# Patient Record
Sex: MALE | Race: WHITE
[De-identification: names, ages, dates, MRNs, and addresses within clinical notes are randomized per-mention and may not be internally consistent; named-entity substitution may affect disease eponyms.]

---

## 2018-10-11 ENCOUNTER — APPOINTMENT (OUTPATIENT)
Dept: PSYCHIATRY | Facility: CLINIC | Age: 53
End: 2018-10-11
Payer: MEDICARE

## 2018-10-11 DIAGNOSIS — Z81.8 FAMILY HISTORY OF OTHER MENTAL AND BEHAVIORAL DISORDERS: ICD-10-CM

## 2018-10-11 DIAGNOSIS — F31.9 BIPOLAR DISORDER, UNSPECIFIED: ICD-10-CM

## 2018-10-11 PROBLEM — Z00.00 ENCOUNTER FOR PREVENTIVE HEALTH EXAMINATION: Status: ACTIVE | Noted: 2018-10-11

## 2018-10-11 PROCEDURE — 99205 OFFICE O/P NEW HI 60 MIN: CPT

## 2018-10-11 RX ORDER — BENZTROPINE MESYLATE 1 MG/1
1 TABLET ORAL
Refills: 0 | Status: ACTIVE | COMMUNITY

## 2018-10-11 RX ORDER — OXCARBAZEPINE 600 MG/1
600 TABLET, FILM COATED ORAL
Qty: 30 | Refills: 1 | Status: ACTIVE | COMMUNITY
Start: 2018-10-11 | End: 1900-01-01

## 2018-10-11 RX ORDER — TRAZODONE HYDROCHLORIDE 100 MG/1
100 TABLET ORAL
Refills: 0 | Status: ACTIVE | COMMUNITY

## 2018-10-11 RX ORDER — OXCARBAZEPINE 300 MG/1
300 TABLET, FILM COATED ORAL
Qty: 30 | Refills: 1 | Status: ACTIVE | COMMUNITY
Start: 2018-10-11 | End: 1900-01-01

## 2018-10-11 RX ORDER — RISPERIDONE 3 MG/1
3 TABLET, FILM COATED ORAL
Qty: 60 | Refills: 1 | Status: ACTIVE | COMMUNITY
Start: 2018-10-11 | End: 1900-01-01

## 2018-10-11 RX ORDER — RISPERIDONE 3 MG/1
3 TABLET, FILM COATED ORAL
Refills: 0 | Status: ACTIVE | COMMUNITY

## 2018-10-11 RX ORDER — OXCARBAZEPINE 600 MG/1
TABLET, FILM COATED ORAL
Refills: 0 | Status: ACTIVE | COMMUNITY

## 2018-10-12 ENCOUNTER — EMERGENCY (EMERGENCY)
Facility: HOSPITAL | Age: 53
LOS: 1 days | Discharge: PSYCHIATRIC OR SUBSTANCE USE FACILITY | End: 2018-10-12
Attending: EMERGENCY MEDICINE
Payer: MEDICARE

## 2018-10-12 ENCOUNTER — INPATIENT (INPATIENT)
Facility: HOSPITAL | Age: 53
LOS: 16 days | Discharge: HOME | End: 2018-10-29
Attending: PSYCHIATRY & NEUROLOGY | Admitting: PSYCHIATRY & NEUROLOGY
Payer: MEDICARE

## 2018-10-12 VITALS — HEIGHT: 75 IN | WEIGHT: 220.02 LBS

## 2018-10-12 VITALS
RESPIRATION RATE: 18 BRPM | OXYGEN SATURATION: 100 % | SYSTOLIC BLOOD PRESSURE: 148 MMHG | DIASTOLIC BLOOD PRESSURE: 95 MMHG | HEART RATE: 88 BPM | TEMPERATURE: 98 F

## 2018-10-12 VITALS — TEMPERATURE: 98 F | WEIGHT: 235.89 LBS | HEIGHT: 75 IN | RESPIRATION RATE: 19 BRPM | OXYGEN SATURATION: 100 %

## 2018-10-12 DIAGNOSIS — F25.0 SCHIZOAFFECTIVE DISORDER, BIPOLAR TYPE: ICD-10-CM

## 2018-10-12 DIAGNOSIS — F20.9 SCHIZOPHRENIA, UNSPECIFIED: ICD-10-CM

## 2018-10-12 LAB
ALBUMIN SERPL ELPH-MCNC: 4.1 G/DL — SIGNIFICANT CHANGE UP (ref 3.3–5)
ALP SERPL-CCNC: 94 U/L — SIGNIFICANT CHANGE UP (ref 40–120)
ALT FLD-CCNC: 75 U/L — HIGH (ref 10–45)
ANION GAP SERPL CALC-SCNC: 14 MMOL/L — SIGNIFICANT CHANGE UP (ref 5–17)
APAP SERPL-MCNC: <15 UG/ML — SIGNIFICANT CHANGE UP (ref 10–30)
APPEARANCE UR: CLEAR — SIGNIFICANT CHANGE UP
AST SERPL-CCNC: 32 U/L — SIGNIFICANT CHANGE UP (ref 10–40)
BASOPHILS # BLD AUTO: 0 K/UL — SIGNIFICANT CHANGE UP (ref 0–0.2)
BASOPHILS NFR BLD AUTO: 0.3 % — SIGNIFICANT CHANGE UP (ref 0–2)
BILIRUB SERPL-MCNC: 0.2 MG/DL — SIGNIFICANT CHANGE UP (ref 0.2–1.2)
BILIRUB UR-MCNC: NEGATIVE — SIGNIFICANT CHANGE UP
BUN SERPL-MCNC: 14 MG/DL — SIGNIFICANT CHANGE UP (ref 7–23)
CALCIUM SERPL-MCNC: 9.1 MG/DL — SIGNIFICANT CHANGE UP (ref 8.4–10.5)
CHLORIDE SERPL-SCNC: 104 MMOL/L — SIGNIFICANT CHANGE UP (ref 96–108)
CO2 SERPL-SCNC: 22 MMOL/L — SIGNIFICANT CHANGE UP (ref 22–31)
COLOR SPEC: YELLOW — SIGNIFICANT CHANGE UP
CREAT SERPL-MCNC: 1.01 MG/DL — SIGNIFICANT CHANGE UP (ref 0.5–1.3)
DIFF PNL FLD: NEGATIVE — SIGNIFICANT CHANGE UP
EOSINOPHIL # BLD AUTO: 0 K/UL — SIGNIFICANT CHANGE UP (ref 0–0.5)
EOSINOPHIL NFR BLD AUTO: 0.6 % — SIGNIFICANT CHANGE UP (ref 0–6)
ETHANOL SERPL-MCNC: SIGNIFICANT CHANGE UP MG/DL (ref 0–10)
GLUCOSE SERPL-MCNC: 94 MG/DL — SIGNIFICANT CHANGE UP (ref 70–99)
GLUCOSE UR QL: NEGATIVE — SIGNIFICANT CHANGE UP
HCT VFR BLD CALC: 40 % — SIGNIFICANT CHANGE UP (ref 39–50)
HGB BLD-MCNC: 13.4 G/DL — SIGNIFICANT CHANGE UP (ref 13–17)
KETONES UR-MCNC: NEGATIVE — SIGNIFICANT CHANGE UP
LEUKOCYTE ESTERASE UR-ACNC: NEGATIVE — SIGNIFICANT CHANGE UP
LYMPHOCYTES # BLD AUTO: 1.4 K/UL — SIGNIFICANT CHANGE UP (ref 1–3.3)
LYMPHOCYTES # BLD AUTO: 28.1 % — SIGNIFICANT CHANGE UP (ref 13–44)
MCHC RBC-ENTMCNC: 28.8 PG — SIGNIFICANT CHANGE UP (ref 27–34)
MCHC RBC-ENTMCNC: 33.6 GM/DL — SIGNIFICANT CHANGE UP (ref 32–36)
MCV RBC AUTO: 85.8 FL — SIGNIFICANT CHANGE UP (ref 80–100)
MONOCYTES # BLD AUTO: 0.8 K/UL — SIGNIFICANT CHANGE UP (ref 0–0.9)
MONOCYTES NFR BLD AUTO: 16.5 % — HIGH (ref 2–14)
NEUTROPHILS # BLD AUTO: 2.8 K/UL — SIGNIFICANT CHANGE UP (ref 1.8–7.4)
NEUTROPHILS NFR BLD AUTO: 54.4 % — SIGNIFICANT CHANGE UP (ref 43–77)
NITRITE UR-MCNC: NEGATIVE — SIGNIFICANT CHANGE UP
PCP SPEC-MCNC: SIGNIFICANT CHANGE UP
PH UR: 6.5 — SIGNIFICANT CHANGE UP (ref 5–8)
PLATELET # BLD AUTO: 165 K/UL — SIGNIFICANT CHANGE UP (ref 150–400)
POTASSIUM SERPL-MCNC: 4 MMOL/L — SIGNIFICANT CHANGE UP (ref 3.5–5.3)
POTASSIUM SERPL-SCNC: 4 MMOL/L — SIGNIFICANT CHANGE UP (ref 3.5–5.3)
PROT SERPL-MCNC: 6.1 G/DL — SIGNIFICANT CHANGE UP (ref 6–8.3)
PROT UR-MCNC: NEGATIVE — SIGNIFICANT CHANGE UP
RBC # BLD: 4.66 M/UL — SIGNIFICANT CHANGE UP (ref 4.2–5.8)
RBC # FLD: 12.7 % — SIGNIFICANT CHANGE UP (ref 10.3–14.5)
SALICYLATES SERPL-MCNC: <2 MG/DL — LOW (ref 15–30)
SODIUM SERPL-SCNC: 140 MMOL/L — SIGNIFICANT CHANGE UP (ref 135–145)
SP GR SPEC: 1.02 — SIGNIFICANT CHANGE UP (ref 1.01–1.02)
UROBILINOGEN FLD QL: NEGATIVE — SIGNIFICANT CHANGE UP
WBC # BLD: 5 K/UL — SIGNIFICANT CHANGE UP (ref 3.8–10.5)
WBC # FLD AUTO: 5 K/UL — SIGNIFICANT CHANGE UP (ref 3.8–10.5)

## 2018-10-12 PROCEDURE — 99285 EMERGENCY DEPT VISIT HI MDM: CPT | Mod: GC,25

## 2018-10-12 PROCEDURE — 93010 ELECTROCARDIOGRAM REPORT: CPT

## 2018-10-12 PROCEDURE — 99285 EMERGENCY DEPT VISIT HI MDM: CPT

## 2018-10-12 PROCEDURE — 80307 DRUG TEST PRSMV CHEM ANLYZR: CPT

## 2018-10-12 PROCEDURE — 93005 ELECTROCARDIOGRAM TRACING: CPT

## 2018-10-12 PROCEDURE — 85027 COMPLETE CBC AUTOMATED: CPT

## 2018-10-12 PROCEDURE — 99222 1ST HOSP IP/OBS MODERATE 55: CPT

## 2018-10-12 PROCEDURE — 80053 COMPREHEN METABOLIC PANEL: CPT

## 2018-10-12 PROCEDURE — 81003 URINALYSIS AUTO W/O SCOPE: CPT

## 2018-10-12 RX ORDER — DIPHENHYDRAMINE HCL 50 MG
50 CAPSULE ORAL ONCE
Refills: 0 | Status: COMPLETED | OUTPATIENT
Start: 2018-10-12 | End: 2018-10-13

## 2018-10-12 RX ORDER — OXCARBAZEPINE 300 MG/1
600 TABLET, FILM COATED ORAL
Refills: 0 | Status: DISCONTINUED | OUTPATIENT
Start: 2018-10-12 | End: 2018-10-29

## 2018-10-12 RX ORDER — HYDROXYZINE HCL 10 MG
50 TABLET ORAL EVERY 6 HOURS
Refills: 0 | Status: DISCONTINUED | OUTPATIENT
Start: 2018-10-12 | End: 2018-10-29

## 2018-10-12 RX ORDER — NICOTINE POLACRILEX 2 MG
1 GUM BUCCAL EVERY 6 HOURS
Refills: 0 | Status: DISCONTINUED | OUTPATIENT
Start: 2018-10-12 | End: 2018-10-29

## 2018-10-12 RX ORDER — OXCARBAZEPINE 300 MG/1
300 TABLET, FILM COATED ORAL
Refills: 0 | Status: DISCONTINUED | OUTPATIENT
Start: 2018-10-12 | End: 2018-10-29

## 2018-10-12 RX ORDER — RISPERIDONE 4 MG/1
3 TABLET ORAL
Refills: 0 | Status: DISCONTINUED | OUTPATIENT
Start: 2018-10-12 | End: 2018-10-26

## 2018-10-12 RX ORDER — HALOPERIDOL DECANOATE 100 MG/ML
5 INJECTION INTRAMUSCULAR ONCE
Refills: 0 | Status: DISCONTINUED | OUTPATIENT
Start: 2018-10-12 | End: 2018-10-15

## 2018-10-12 RX ORDER — NICOTINE POLACRILEX 2 MG
1 GUM BUCCAL ONCE
Refills: 0 | Status: DISCONTINUED | OUTPATIENT
Start: 2018-10-12 | End: 2018-10-16

## 2018-10-12 RX ORDER — DOCUSATE SODIUM 100 MG
100 CAPSULE ORAL DAILY
Refills: 0 | Status: DISCONTINUED | OUTPATIENT
Start: 2018-10-12 | End: 2018-10-29

## 2018-10-12 RX ORDER — DIPHENHYDRAMINE HCL 50 MG
50 CAPSULE ORAL ONCE
Refills: 0 | Status: DISCONTINUED | OUTPATIENT
Start: 2018-10-12 | End: 2018-10-29

## 2018-10-12 RX ADMIN — RISPERIDONE 3 MILLIGRAM(S): 4 TABLET ORAL at 22:22

## 2018-10-12 RX ADMIN — Medication 1 EACH: at 22:22

## 2018-10-12 RX ADMIN — Medication 100 MILLIGRAM(S): at 22:55

## 2018-10-12 RX ADMIN — Medication 50 MILLIGRAM(S): at 22:22

## 2018-10-12 RX ADMIN — OXCARBAZEPINE 600 MILLIGRAM(S): 300 TABLET, FILM COATED ORAL at 22:22

## 2018-10-12 NOTE — ED PROVIDER NOTE - MEDICAL DECISION MAKING DETAILS
Known schizophrenic with recent admission presenting with command hallucinations telling him to harm himself, Known schizophrenic with recent admission presenting with command hallucinations telling him to harm himself. Plan: medical clearance labs, psych consult, 1:1 observation

## 2018-10-12 NOTE — ED BEHAVIORAL HEALTH ASSESSMENT NOTE - DETAILS
patient with command auditory hallucinations to walk into traffic unknown availability of beds Spoke with Marianne Olivo A

## 2018-10-12 NOTE — ED BEHAVIORAL HEALTH ASSESSMENT NOTE - AXIS IV
Problems with primary support/Problem related to social environment/Housing problems/Economic problems

## 2018-10-12 NOTE — ED BEHAVIORAL HEALTH ASSESSMENT NOTE - PSYCHIATRIC ISSUES AND PLAN (INCLUDE STANDING AND PRN MEDICATION)
Schizoaffective Disorder - Continue Trileptal 900 mg Schizoaffective Disorder - Continue Trileptal 900 mg daily and Risperdal 3 mg BID

## 2018-10-12 NOTE — ED PROVIDER NOTE - PHYSICAL EXAMINATION
Gen: NAD, AOx3  Head: NCAT  HEENT: PERRL, oral mucosa moist, normal conjunctiva  Lung: CTAB, no respiratory distress  CV: rrr, no murmurs, Normal perfusion  Abd: soft, NTND, no CVA tenderness  MSK: No edema, no visible deformities  Neuro: No focal neurologic deficits, CN intact, motor and sensation intact  Skin: No rash   Psych: normal affect

## 2018-10-12 NOTE — ED ADULT NURSE NOTE - NSIMPLEMENTINTERV_GEN_ALL_ED
Implemented All Universal Safety Interventions:  [place] to call system. Call bell, personal items and telephone within reach. Instruct patient to call for assistance. Room bathroom lighting operational. Non-slip footwear when patient is off stretcher. Physically safe environment: no spills, clutter or unnecessary equipment. Stretcher in lowest position, wheels locked, appropriate side rails in place.

## 2018-10-12 NOTE — ED ADULT NURSE NOTE - EYE CONTACT
N: DASH/TLC diet, 1L fluid restriction  E: Replete lyytes PRN K<4 Mg<2   Dispo: [facility]ach; PT eval- rec Home w/o home PT N: DASH/TLC diet, 1L fluid restriction  E: Replete lyytes PRN K<4 Mg<2   Dispo: D/c pending clinical progress.  PT eval- rec Home w/o home PT Poor

## 2018-10-12 NOTE — CHART NOTE - NSCHARTNOTEFT_GEN_A_CORE
Screening Medical Evaluation  Patient Admitted from: [facility] ER    [facility] admitting diagnosis: Schizophrenia    PAST MEDICAL & SURGICAL HISTORY:        Allergies    No Known Allergies    Intolerances        Social History:     FAMILY HISTORY:      MEDICATIONS  (STANDING):  diphenhydrAMINE 50 milliGRAM(s) Oral once  docusate sodium 100 milliGRAM(s) Oral daily  OXcarbazepine 300 milliGRAM(s) Oral <User Schedule>  OXcarbazepine 600 milliGRAM(s) Oral <User Schedule>  risperiDONE   Tablet 3 milliGRAM(s) Oral two times a day    MEDICATIONS  (PRN):  diphenhydrAMINE   Injectable 50 milliGRAM(s) IntraMuscular once PRN agitation  haloperidol    Injectable 5 milliGRAM(s) IntraMuscular once PRN psychotic agitation  hydrOXYzine hydrochloride 50 milliGRAM(s) Oral every 6 hours PRN Anxiety  LORazepam   Injectable 2 milliGRAM(s) IntraMuscular once PRN Agitation  nicotine - Inhaler 1 Each Inhalation every 6 hours PRN nicotine craving      Vital Signs Last 24 Hrs  T(C): 36.7 (12 Oct 2018 21:54), Max: 36.7 (12 Oct 2018 14:43)  T(F): 98 (12 Oct 2018 21:54), Max: 98 (12 Oct 2018 14:43)  HR: 88 (12 Oct 2018 18:58) (86 - 88)  BP: 148/95 (12 Oct 2018 18:58) (148/95 - 151/82)  BP(mean): --  RR: 19 (12 Oct 2018 21:54) (18 - 19)  SpO2: 100% (12 Oct 2018 21:54) (98% - 100%)  CAPILLARY BLOOD GLUCOSE            PHYSICAL EXAM:  GENERAL: NAD, well-developed  HEAD:  Atraumatic, Normocephalic  EYES: EOMI, PERRLA, conjunctiva and sclera clear  NECK: Supple, No JVD  CHEST/LUNG: Clear to auscultation bilaterally; No wheeze  HEART: Regular rate and rhythm; No murmurs, rubs, or gallops  ABDOMEN: Soft, Nontender, Nondistended; Bowel sounds present  EXTREMITIES:  2+ Peripheral Pulses, No clubbing, cyanosis, or edema  PSYCH: AAOx3  NEUROLOGY: non-focal  SKIN: In tact    LABS:                        13.4   5.0   )-----------( 165      ( 12 Oct 2018 15:34 )             40.0     10-12    140  |  104  |  14  ----------------------------<  94  4.0   |  22  |  1.01    Ca    9.1      12 Oct 2018 15:34    TPro  6.1  /  Alb  4.1  /  TBili  0.2  /  DBili  x   /  AST  32  /  ALT  75<H>  /  AlkPhos  94  10-12          Urinalysis Basic - ( 12 Oct 2018 15:34 )    Color: Yellow / Appearance: Clear / S.018 / pH: x  Gluc: x / Ketone: Negative  / Bili: Negative / Urobili: Negative   Blood: x / Protein: Negative / Nitrite: Negative   Leuk Esterase: Negative / RBC: x / WBC x   Sq Epi: x / Non Sq Epi: x / Bacteria: x        RADIOLOGY & ADDITIONAL TESTS:    Assessment and Plan: 52 yo M with no significant PMH is admitted to [facility] with a primary psychiatric diagnosis of Schizophrenia. The pt currently denies having any medical complaints such as chest pain, sob, abdominal pain, n/v/d/c, or any problems with urination or bowel movements. The rest of his screening physical is unremarkable.    1.Schizophrenia-Plan: continue with meds as per primary psychiatric team

## 2018-10-12 NOTE — ED BEHAVIORAL HEALTH ASSESSMENT NOTE - HPI (INCLUDE ILLNESS QUALITY, SEVERITY, DURATION, TIMING, CONTEXT, MODIFYING FACTORS, ASSOCIATED SIGNS AND SYMPTOMS)
Mr. Shaila Fisher is a 52 yo male with a history of schizoaffective disorder, multiple psychiatric admissions, and no suicide attempts with a medical history significant for HTN who presents due to manic symptoms. Patient is nonsensical and demanding to be admitted to the psychiatric hospital.    He states that he was previously living in [place], [place] but is originally from [place]. He states that 5 days ago he took a [employer] flight from [place] to [place]. He states that his ultimate destination is to go to [employer] and meet the [employer]. He did not endorse any S/I/I/P at the time of interview. He maintained that he was having a manic episode and had been "doing a lot of stuff" but needed to "take it easy" now. He stated that he found Dr. Casanova after walking around the area and noted that she was in behavioral health. He stated that he stopped by Dr. Casanova's office again today and was subsequently sent to the ED. The patient noted that he was in a psychiatric hospital a week ago and promised that he would be "a good patient this time." He mused that he was probably "annoying to others" during his last hospitalization and said that he would "try not to do that this time"     Patient told the ED staff that he was having auditory hallucinations to walk into traffic. He also arrived with plastic around his legs that were intended to be "leg shackles"    Patient stated that he is currently homeless and had been staying in hotels. He stayed [place] [employer] [place].     Collateral was obtained from the patient's psychiatrist who stated that the patient appeared manic in her office. The patient was impulsive and removed his shirt. Her confirmed that he was from [place] and was denying S/I/I/P at that time. She was able to speak with his cousin Dr. Ajay Fisher who confirmed that the patient has had multiple manic episodes in the past.     Patient gave permission to talk to his cousin Dr. Ajay Fisher in [place] however the writer was unable to reach him. Mr. Shaila Fisher is a 52 yo male with a history of schizoaffective disorder, multiple psychiatric admissions, and no suicide attempts with a medical history significant for HTN who presents due to manic symptoms. Patient is nonsensical and demanding to be admitted to the psychiatric hospital.    He states that he was previously living in [place], [place] but is originally from [place]. He states that 5 days ago he took a [employer] flight from [place] to [place]. He states that his ultimate destination is to go to [employer] and meet the ray. He did not endorse any S/I/I/P at the time of interview. He maintained that he was having a manic episode and had been "doing a lot of stuff" but needed to "take it easy" now. He stated that he found Dr. Casanova after walking around the area and noted that she was in behavioral health. He stated that he stopped by Dr. Casanova's office again today and was subsequently sent to the ED. The patient noted that he was in a psychiatric hospital a week ago and promised that he would be "a good patient this time." He mused that he was probably "annoying to others" during his last hospitalization and said that he would "try not to do that this time"     Patient told the ED staff that he was having auditory hallucinations to walk into traffic. He also arrived with plastic around his legs that were intended to be "leg shackles"    Patient stated that he is currently homeless and had been staying in hotels. [place] [employer] [place].     Collateral was obtained from the patient's psychiatrist who stated that the patient appeared manic in her office. The patient was impulsive and removed his shirt. Her confirmed that he was from [place] and was denying S/I/I/P at that time. She was able to speak with his cousin Dr. Ajay Fisher who confirmed that the patient has had multiple manic episodes in the past.     Brother, Dr. Ajay Fisher [phone], states that he hasn't been doing well, was homeless, and has been manic "forever". Brother states that it's been hard to keep track of him and has been living in hotels. Brother states that he flew up to [place] on a "whim" told brother he was "on the move" and going to "get off disability" and "get a job." He has had a tough year and he was kicked out of multiple apartments because he was "bothering" other tenants. He has been homeless for approximately 1 month.  Brother states that he was being seen at [facility] in [place] but was having trouble making follow up appointments. Brother states that he has a history of being relatively stable on [place] but had trouble making it to his appointments on time.

## 2018-10-12 NOTE — CHART NOTE - NSCHARTNOTEFT_GEN_A_CORE
EMERGENCY ROOM : Chart reviewed for inpatient psych transfer. Patient is a 54 y/o, male, non-caregiver, undomiciled, with PMH of HTN, PPH of Schizoaffective d/o, Bipolar Type, BIBEMS for manic episode. Patient endorsed to  Psych, command auditory hallucinations to walk into traffic. Patient with disorganized thoughts and impaired reasoning. Per  Psych, patient would benefit from inpatient psych admission for safety stabilization, medication management, and psychiatric evaluation. Patient consented to voluntary legal status.    LMSW made outreach to [facility] (ph. [phone]/beeper #[id number]) for adult bed availability. Patient assigned to [address] [place] [address] (ph. [phone]). Unit Attending is Dr. Solis. LMSW informed [facility] of the above and made aware to complete Medical Readiness Form and provide report to [facility]. Transfer packet completed. LMSW faxed copy of Legal Status, EKG, and Medical Readiness Form to [facility] for review by [facility]. ED Medical Team informed of the above and reports patient is medically cleared for transfer to inpatient psych. Treating RN to contact [address] with report and to contact [facility] to arrange transport from [facility] ED to [facility].     Patient with Medicare (policy #[id number]) insurance benefits. Patient reports no secondary insurance benefits. No prior authorization required for inpatient psych transfer. [facility] email sent with the above information. [facility] email sent with all transfer details.    LMSW met with patient at bedside. Patient reports he has not have permanent housing for many years. Patient reports residing in a hotel in [place] for the past 3 months with recent travel to [place]. Patient reports he has been renting a room at [facility] for the past 2 days. Patient identifies sister, Mariana DANRachelle (ph. [phone]) and brother, Dr. Giordano (ph. [phone]) as emergency contacts. Per patient, no immediate family members reside in [place]. Patient aware of pending bed assignment for psychiatric admission. Patient informed of accepting unit and requested for LMSW to contact brother with transfer details. Message left on [phone] including contact information for [address]. Patient awaiting transport.

## 2018-10-12 NOTE — ED BEHAVIORAL HEALTH ASSESSMENT NOTE - CASE SUMMARY
This is a 53-y.o. CM pt. with a history of schizoaffective disorder, multiple psychiatric admissions, and no suicide attempts with a medical history significant for HTN who presents to the ED due to manic symptoms, BIB EMS from an outpatient setting. Patient is nonsensical and demanding to be admitted to the psychiatric hospital, endorsing having CAH to walk into traffic. He has had impulsive behaviors, is disorganized on interview, and feels unsafe without inpatient psychiatric treatment. It is possible that the patient is embellishing his sx. He is, however, not a safe discharge and would benefit from inpatient psychiatric hospitalization for safety, stabilization, and medication management.    I have seen and evaluated this patient myself. Chart, labs, meds reviewed. I agree with resident's assessment and plan.

## 2018-10-12 NOTE — ED BEHAVIORAL HEALTH ASSESSMENT NOTE - RISK ASSESSMENT
Mr. Fisher is at moderate to high risk of self harm given his current mood episode and CAH to harm himself. Other dynamic risk factors include current homelessness, social isolation, recent discharge from a psychiatric hospital, poor coping skills, unemployed status, and impulsivity, Static risk factors include his male gender, his age, history of multiple psychiatric hospitalizations, and his history of schizoaffective disorder. Protective factors include treatment adherence, help seeking behaviors, no history of suicide attempts, lack of access to firearms, and a desire to get better. Risk will further be ameliorated by inpatient admission to a psychiatric hospital with limited access to means.

## 2018-10-12 NOTE — ED BEHAVIORAL HEALTH ASSESSMENT NOTE - SUMMARY
Mr. Shaila Fisher is a 52 yo male with a history of schizoaffective disorder, multiple psychiatric admissions, and no suicide attempts with a medical history significant for HTN who presents due to manic symptoms. Patient is nonsensical and demanding to be admitted to the psychiatric hospital. Mr. Fisher endorses having CAH to walk into traffic. He has had impulsive behaviors, is disorganized on interview, and feels unsafe without inpatient psychiatric treatment. He would benefit from inpatient psychiatric hospitalization for safety stabilization, medication management, and psychiatric evaluation.

## 2018-10-12 NOTE — ED BEHAVIORAL HEALTH ASSESSMENT NOTE - OTHER PAST PSYCHIATRIC HISTORY (INCLUDE DETAILS REGARDING ONSET, COURSE OF ILLNESS, INPATIENT/OUTPATIENT TREATMENT)
Patient stated that he was first hospitalized at age 23 for a "psychotic break down" and was last hospitalized at "[facility]" approximately one week ago. He endorsed having numerous other psychiatric admissions.

## 2018-10-12 NOTE — ED BEHAVIORAL HEALTH ASSESSMENT NOTE - DESCRIPTION
See HPI HTN patient is originally from [place] but had been living in the [facility]. He states that he is a college graduate and has had "numerous jobs" but was "disabled at 27" for some undisclosed reason.

## 2018-10-12 NOTE — ED ADULT NURSE NOTE - OBJECTIVE STATEMENT
53 year old male BIB EMS for psychosis. A&O; denies NGOZI; +AH; denies ETOH and drug use; Axis III: HTN; allergy to Haldol. Pt lives in [place], has been here several days but states "I plead the fifth" when asked why he came here, also stated same when asked about friends and family and housing; as per EMS pt walked into a help center and sat on the floor and would not talk to anyone and was wearing a piece of plastic wrapped around his ankles that he called his "leg shackles" and claimed he had charges against him; upon arrival to ED he was bizarrely related, not making eye contact and closing eyes while he walked, eventually appeared comfortable being interviewed, states he has a long HX of schizophrenia and schizoaffective, is currently taking Risperdal and Trileptal but he is still hearing voices, command in nature, telling him to walk into traffic; states he "wants to be admitted". Security applied metal detection and took belongings, valuables sent to safe. Continue to monitor.

## 2018-10-12 NOTE — ED PROVIDER NOTE - PROGRESS NOTE DETAILS
psych paged for consult Attending Marianne Olivo: I received sign out pt to be voluntary admitted Accepted for admission at [facility], [address] [place] [address]. Medically cleared. -SM

## 2018-10-12 NOTE — ED PROVIDER NOTE - OBJECTIVE STATEMENT
Patient is 53 y M with PMH htn, schizoaffective/schizophrenia on risperdal and trileptal presenting with worsening command auditory hallucinations that are telling the patient to walk into traffic, etc. Patient was sent via EMS from [facility]. Tells us he was recently hospitalized and since dc has been hearing voices, wants to be admitted inpatient because he is afraid of what the voices are saying, does not want to commit suicide or hurt himself, denies HI, has not been feeling ill recently. Current smoker, sober from ETOH and crack cocaine x 8 mo.     Psych: pony  ROS: Denies fever, palpitations, chills, recent sickness, HA, vision changes, cough, SOB, chest pain, abdominal pain, n/v/d/c, dysuria, hematuria, rash, new joint aches, sick contacts, and recent travel. Patient is 53 y M with PMH htn, schizoaffective/schizophrenia on risperdal and trileptal presenting with worsening command auditory hallucinations that are telling the patient to walk into traffic, etc, also had plastic wrapped around his ankles that he said were "leg shackles." Patient was sent via EMS from [facility]. Tells us he was recently hospitalized and since dc has been hearing voices, wants to be admitted inpatient because he is afraid of what the voices are saying, does not want to commit suicide or hurt himself, denies HI, has not been feeling ill recently. Current smoker, sober from ETOH and crack cocaine x 8 mo.     Psych: pony  ROS: Denies fever, palpitations, chills, recent sickness, HA, vision changes, cough, SOB, chest pain, abdominal pain, n/v/d/c, dysuria, hematuria, rash, new joint aches, sick contacts, and recent travel.

## 2018-10-13 PROCEDURE — 99232 SBSQ HOSP IP/OBS MODERATE 35: CPT

## 2018-10-13 RX ADMIN — Medication 1 EACH: at 09:33

## 2018-10-13 RX ADMIN — Medication 50 MILLIGRAM(S): at 20:29

## 2018-10-13 RX ADMIN — RISPERIDONE 3 MILLIGRAM(S): 4 TABLET ORAL at 08:35

## 2018-10-13 RX ADMIN — RISPERIDONE 3 MILLIGRAM(S): 4 TABLET ORAL at 20:29

## 2018-10-13 RX ADMIN — OXCARBAZEPINE 600 MILLIGRAM(S): 300 TABLET, FILM COATED ORAL at 20:29

## 2018-10-13 RX ADMIN — Medication 100 MILLIGRAM(S): at 08:35

## 2018-10-13 RX ADMIN — Medication 50 MILLIGRAM(S): at 15:30

## 2018-10-13 RX ADMIN — Medication 50 MILLIGRAM(S): at 02:50

## 2018-10-13 RX ADMIN — OXCARBAZEPINE 300 MILLIGRAM(S): 300 TABLET, FILM COATED ORAL at 08:35

## 2018-10-14 PROCEDURE — 99232 SBSQ HOSP IP/OBS MODERATE 35: CPT

## 2018-10-14 RX ADMIN — OXCARBAZEPINE 600 MILLIGRAM(S): 300 TABLET, FILM COATED ORAL at 20:15

## 2018-10-14 RX ADMIN — RISPERIDONE 3 MILLIGRAM(S): 4 TABLET ORAL at 20:15

## 2018-10-14 RX ADMIN — Medication 50 MILLIGRAM(S): at 09:56

## 2018-10-14 RX ADMIN — OXCARBAZEPINE 300 MILLIGRAM(S): 300 TABLET, FILM COATED ORAL at 09:00

## 2018-10-14 RX ADMIN — RISPERIDONE 3 MILLIGRAM(S): 4 TABLET ORAL at 09:00

## 2018-10-14 RX ADMIN — Medication 50 MILLIGRAM(S): at 20:15

## 2018-10-14 RX ADMIN — Medication 100 MILLIGRAM(S): at 09:00

## 2018-10-15 PROCEDURE — 99232 SBSQ HOSP IP/OBS MODERATE 35: CPT

## 2018-10-15 RX ORDER — DIVALPROEX SODIUM 500 MG/1
1000 TABLET, DELAYED RELEASE ORAL AT BEDTIME
Refills: 0 | Status: DISCONTINUED | OUTPATIENT
Start: 2018-10-15 | End: 2018-10-19

## 2018-10-15 RX ORDER — OLANZAPINE 15 MG/1
5 TABLET, FILM COATED ORAL EVERY 4 HOURS
Refills: 0 | Status: DISCONTINUED | OUTPATIENT
Start: 2018-10-15 | End: 2018-10-16

## 2018-10-15 RX ORDER — DIPHENHYDRAMINE HCL 50 MG
50 CAPSULE ORAL ONCE
Refills: 0 | Status: COMPLETED | OUTPATIENT
Start: 2018-10-15 | End: 2018-10-15

## 2018-10-15 RX ORDER — HALOPERIDOL DECANOATE 100 MG/ML
7.5 INJECTION INTRAMUSCULAR ONCE
Refills: 0 | Status: DISCONTINUED | OUTPATIENT
Start: 2018-10-15 | End: 2018-10-29

## 2018-10-15 RX ORDER — CLONAZEPAM 1 MG
1 TABLET ORAL THREE TIMES A DAY
Refills: 0 | Status: DISCONTINUED | OUTPATIENT
Start: 2018-10-15 | End: 2018-10-17

## 2018-10-15 RX ORDER — HALOPERIDOL DECANOATE 100 MG/ML
7.5 INJECTION INTRAMUSCULAR ONCE
Refills: 0 | Status: COMPLETED | OUTPATIENT
Start: 2018-10-15 | End: 2018-10-15

## 2018-10-15 RX ADMIN — OXCARBAZEPINE 600 MILLIGRAM(S): 300 TABLET, FILM COATED ORAL at 22:16

## 2018-10-15 RX ADMIN — RISPERIDONE 3 MILLIGRAM(S): 4 TABLET ORAL at 11:15

## 2018-10-15 RX ADMIN — RISPERIDONE 3 MILLIGRAM(S): 4 TABLET ORAL at 22:16

## 2018-10-15 RX ADMIN — Medication 100 MILLIGRAM(S): at 11:15

## 2018-10-15 RX ADMIN — OXCARBAZEPINE 300 MILLIGRAM(S): 300 TABLET, FILM COATED ORAL at 11:15

## 2018-10-15 RX ADMIN — HALOPERIDOL DECANOATE 7.5 MILLIGRAM(S): 100 INJECTION INTRAMUSCULAR at 11:40

## 2018-10-15 RX ADMIN — Medication 2 MILLIGRAM(S): at 11:27

## 2018-10-15 RX ADMIN — OLANZAPINE 5 MILLIGRAM(S): 15 TABLET, FILM COATED ORAL at 11:15

## 2018-10-15 RX ADMIN — DIVALPROEX SODIUM 1000 MILLIGRAM(S): 500 TABLET, DELAYED RELEASE ORAL at 22:16

## 2018-10-15 RX ADMIN — Medication 2 MILLIGRAM(S): at 11:40

## 2018-10-15 RX ADMIN — Medication 1 MILLIGRAM(S): at 22:16

## 2018-10-15 RX ADMIN — Medication 50 MILLIGRAM(S): at 11:40

## 2018-10-16 LAB
CHOLEST SERPL-MCNC: 159 MG/DL — SIGNIFICANT CHANGE UP (ref 120–199)
HDLC SERPL-MCNC: 43 MG/DL — SIGNIFICANT CHANGE UP (ref 35–55)
LIPID PNL WITH DIRECT LDL SERPL: 95 MG/DL — SIGNIFICANT CHANGE UP
TRIGL SERPL-MCNC: 244 MG/DL — HIGH (ref 10–149)
TSH SERPL-MCNC: 3.76 UIU/ML — SIGNIFICANT CHANGE UP (ref 0.27–4.2)
VIT B12 SERPL-MCNC: 379 PG/ML — SIGNIFICANT CHANGE UP (ref 200–900)

## 2018-10-16 PROCEDURE — 99232 SBSQ HOSP IP/OBS MODERATE 35: CPT | Mod: GC

## 2018-10-16 RX ORDER — OLANZAPINE 15 MG/1
10 TABLET, FILM COATED ORAL EVERY 4 HOURS
Refills: 0 | Status: DISCONTINUED | OUTPATIENT
Start: 2018-10-16 | End: 2018-10-29

## 2018-10-16 RX ORDER — OLANZAPINE 15 MG/1
10 TABLET, FILM COATED ORAL EVERY 4 HOURS
Refills: 0 | Status: DISCONTINUED | OUTPATIENT
Start: 2018-10-16 | End: 2018-10-16

## 2018-10-16 RX ADMIN — OXCARBAZEPINE 300 MILLIGRAM(S): 300 TABLET, FILM COATED ORAL at 09:00

## 2018-10-16 RX ADMIN — Medication 50 MILLIGRAM(S): at 21:45

## 2018-10-16 RX ADMIN — Medication 100 MILLIGRAM(S): at 09:00

## 2018-10-16 RX ADMIN — RISPERIDONE 3 MILLIGRAM(S): 4 TABLET ORAL at 09:00

## 2018-10-16 RX ADMIN — Medication 1 EACH: at 21:45

## 2018-10-16 RX ADMIN — Medication 1 MILLIGRAM(S): at 13:00

## 2018-10-16 RX ADMIN — OXCARBAZEPINE 600 MILLIGRAM(S): 300 TABLET, FILM COATED ORAL at 21:14

## 2018-10-16 RX ADMIN — Medication 1 MILLIGRAM(S): at 21:14

## 2018-10-16 RX ADMIN — DIVALPROEX SODIUM 1000 MILLIGRAM(S): 500 TABLET, DELAYED RELEASE ORAL at 21:14

## 2018-10-16 RX ADMIN — RISPERIDONE 3 MILLIGRAM(S): 4 TABLET ORAL at 21:14

## 2018-10-16 RX ADMIN — Medication 1 MILLIGRAM(S): at 09:00

## 2018-10-17 LAB
T PALLIDUM AB TITR SER: NEGATIVE — SIGNIFICANT CHANGE UP

## 2018-10-17 PROCEDURE — 99232 SBSQ HOSP IP/OBS MODERATE 35: CPT | Mod: GC

## 2018-10-17 RX ORDER — CLONAZEPAM 1 MG
1 TABLET ORAL THREE TIMES A DAY
Refills: 0 | Status: DISCONTINUED | OUTPATIENT
Start: 2018-10-17 | End: 2018-10-19

## 2018-10-17 RX ADMIN — Medication 50 MILLIGRAM(S): at 23:50

## 2018-10-17 RX ADMIN — Medication 1 MILLIGRAM(S): at 13:09

## 2018-10-17 RX ADMIN — Medication 1 MILLIGRAM(S): at 23:50

## 2018-10-17 RX ADMIN — Medication 100 MILLIGRAM(S): at 09:00

## 2018-10-17 RX ADMIN — OXCARBAZEPINE 600 MILLIGRAM(S): 300 TABLET, FILM COATED ORAL at 21:32

## 2018-10-17 RX ADMIN — OXCARBAZEPINE 300 MILLIGRAM(S): 300 TABLET, FILM COATED ORAL at 09:00

## 2018-10-17 RX ADMIN — Medication 1 EACH: at 10:39

## 2018-10-17 RX ADMIN — Medication 1 MILLIGRAM(S): at 09:00

## 2018-10-17 RX ADMIN — RISPERIDONE 3 MILLIGRAM(S): 4 TABLET ORAL at 21:32

## 2018-10-17 RX ADMIN — DIVALPROEX SODIUM 1000 MILLIGRAM(S): 500 TABLET, DELAYED RELEASE ORAL at 21:32

## 2018-10-17 RX ADMIN — RISPERIDONE 3 MILLIGRAM(S): 4 TABLET ORAL at 09:00

## 2018-10-18 PROCEDURE — 90853 GROUP PSYCHOTHERAPY: CPT

## 2018-10-18 PROCEDURE — 99232 SBSQ HOSP IP/OBS MODERATE 35: CPT | Mod: GC,25

## 2018-10-18 RX ORDER — PALIPERIDONE 1.5 MG/1
234 TABLET, EXTENDED RELEASE ORAL ONCE
Refills: 0 | Status: COMPLETED | OUTPATIENT
Start: 2018-10-18 | End: 2018-10-18

## 2018-10-18 RX ORDER — PALIPERIDONE 1.5 MG/1
156 TABLET, EXTENDED RELEASE ORAL ONCE
Refills: 0 | Status: COMPLETED | OUTPATIENT
Start: 2018-10-25 | End: 2018-10-25

## 2018-10-18 RX ADMIN — RISPERIDONE 3 MILLIGRAM(S): 4 TABLET ORAL at 09:45

## 2018-10-18 RX ADMIN — Medication 1 MILLIGRAM(S): at 09:45

## 2018-10-18 RX ADMIN — DIVALPROEX SODIUM 1000 MILLIGRAM(S): 500 TABLET, DELAYED RELEASE ORAL at 21:33

## 2018-10-18 RX ADMIN — Medication 1 MILLIGRAM(S): at 12:00

## 2018-10-18 RX ADMIN — OXCARBAZEPINE 600 MILLIGRAM(S): 300 TABLET, FILM COATED ORAL at 21:33

## 2018-10-18 RX ADMIN — Medication 100 MILLIGRAM(S): at 09:45

## 2018-10-18 RX ADMIN — Medication 1 MILLIGRAM(S): at 21:33

## 2018-10-18 RX ADMIN — PALIPERIDONE 234 MILLIGRAM(S): 1.5 TABLET, EXTENDED RELEASE ORAL at 14:31

## 2018-10-18 RX ADMIN — RISPERIDONE 3 MILLIGRAM(S): 4 TABLET ORAL at 21:33

## 2018-10-18 RX ADMIN — OXCARBAZEPINE 300 MILLIGRAM(S): 300 TABLET, FILM COATED ORAL at 09:45

## 2018-10-19 LAB
ALBUMIN SERPL ELPH-MCNC: 4.1 G/DL — SIGNIFICANT CHANGE UP (ref 3.3–5)
ALP SERPL-CCNC: 77 U/L — SIGNIFICANT CHANGE UP (ref 40–120)
ALT FLD-CCNC: 59 U/L — HIGH (ref 4–41)
AST SERPL-CCNC: 32 U/L — SIGNIFICANT CHANGE UP (ref 4–40)
BASOPHILS # BLD AUTO: 0.03 K/UL — SIGNIFICANT CHANGE UP (ref 0–0.2)
BASOPHILS NFR BLD AUTO: 0.4 % — SIGNIFICANT CHANGE UP (ref 0–2)
BILIRUB SERPL-MCNC: 0.2 MG/DL — SIGNIFICANT CHANGE UP (ref 0.2–1.2)
BUN SERPL-MCNC: 18 MG/DL — SIGNIFICANT CHANGE UP (ref 7–23)
CALCIUM SERPL-MCNC: 9.4 MG/DL — SIGNIFICANT CHANGE UP (ref 8.4–10.5)
CHLORIDE SERPL-SCNC: 100 MMOL/L — SIGNIFICANT CHANGE UP (ref 98–107)
CO2 SERPL-SCNC: 26 MMOL/L — SIGNIFICANT CHANGE UP (ref 22–31)
CREAT SERPL-MCNC: 0.91 MG/DL — SIGNIFICANT CHANGE UP (ref 0.5–1.3)
EOSINOPHIL # BLD AUTO: 0 K/UL — SIGNIFICANT CHANGE UP (ref 0–0.5)
EOSINOPHIL NFR BLD AUTO: 0 % — SIGNIFICANT CHANGE UP (ref 0–6)
GLUCOSE SERPL-MCNC: 149 MG/DL — HIGH (ref 70–99)
HCT VFR BLD CALC: 41.7 % — SIGNIFICANT CHANGE UP (ref 39–50)
HGB BLD-MCNC: 13.7 G/DL — SIGNIFICANT CHANGE UP (ref 13–17)
IMM GRANULOCYTES # BLD AUTO: 0.18 # — SIGNIFICANT CHANGE UP
IMM GRANULOCYTES NFR BLD AUTO: 2.6 % — HIGH (ref 0–1.5)
LYMPHOCYTES # BLD AUTO: 2.08 K/UL — SIGNIFICANT CHANGE UP (ref 1–3.3)
LYMPHOCYTES # BLD AUTO: 30 % — SIGNIFICANT CHANGE UP (ref 13–44)
MCHC RBC-ENTMCNC: 29.3 PG — SIGNIFICANT CHANGE UP (ref 27–34)
MCHC RBC-ENTMCNC: 32.9 % — SIGNIFICANT CHANGE UP (ref 32–36)
MCV RBC AUTO: 89.3 FL — SIGNIFICANT CHANGE UP (ref 80–100)
MONOCYTES # BLD AUTO: 0.58 K/UL — SIGNIFICANT CHANGE UP (ref 0–0.9)
MONOCYTES NFR BLD AUTO: 8.4 % — SIGNIFICANT CHANGE UP (ref 2–14)
NEUTROPHILS # BLD AUTO: 4.06 K/UL — SIGNIFICANT CHANGE UP (ref 1.8–7.4)
NEUTROPHILS NFR BLD AUTO: 58.6 % — SIGNIFICANT CHANGE UP (ref 43–77)
NRBC # FLD: 0 — SIGNIFICANT CHANGE UP
PLATELET # BLD AUTO: 171 K/UL — SIGNIFICANT CHANGE UP (ref 150–400)
PMV BLD: 9.8 FL — SIGNIFICANT CHANGE UP (ref 7–13)
POTASSIUM SERPL-MCNC: 4.4 MMOL/L — SIGNIFICANT CHANGE UP (ref 3.5–5.3)
POTASSIUM SERPL-SCNC: 4.4 MMOL/L — SIGNIFICANT CHANGE UP (ref 3.5–5.3)
PROT SERPL-MCNC: 6.5 G/DL — SIGNIFICANT CHANGE UP (ref 6–8.3)
RBC # BLD: 4.67 M/UL — SIGNIFICANT CHANGE UP (ref 4.2–5.8)
RBC # FLD: 13.2 % — SIGNIFICANT CHANGE UP (ref 10.3–14.5)
SODIUM SERPL-SCNC: 139 MMOL/L — SIGNIFICANT CHANGE UP (ref 135–145)
VALPROATE SERPL-MCNC: 56 UG/ML — SIGNIFICANT CHANGE UP (ref 50–100)
WBC # BLD: 6.93 K/UL — SIGNIFICANT CHANGE UP (ref 3.8–10.5)
WBC # FLD AUTO: 6.93 K/UL — SIGNIFICANT CHANGE UP (ref 3.8–10.5)

## 2018-10-19 PROCEDURE — 90853 GROUP PSYCHOTHERAPY: CPT

## 2018-10-19 PROCEDURE — 99232 SBSQ HOSP IP/OBS MODERATE 35: CPT | Mod: 25

## 2018-10-19 RX ORDER — CLONAZEPAM 1 MG
0.5 TABLET ORAL THREE TIMES A DAY
Refills: 0 | Status: DISCONTINUED | OUTPATIENT
Start: 2018-10-19 | End: 2018-10-23

## 2018-10-19 RX ORDER — DIVALPROEX SODIUM 500 MG/1
1500 TABLET, DELAYED RELEASE ORAL AT BEDTIME
Refills: 0 | Status: DISCONTINUED | OUTPATIENT
Start: 2018-10-19 | End: 2018-10-24

## 2018-10-19 RX ADMIN — DIVALPROEX SODIUM 1500 MILLIGRAM(S): 500 TABLET, DELAYED RELEASE ORAL at 20:54

## 2018-10-19 RX ADMIN — OXCARBAZEPINE 300 MILLIGRAM(S): 300 TABLET, FILM COATED ORAL at 09:25

## 2018-10-19 RX ADMIN — OXCARBAZEPINE 600 MILLIGRAM(S): 300 TABLET, FILM COATED ORAL at 20:54

## 2018-10-19 RX ADMIN — RISPERIDONE 3 MILLIGRAM(S): 4 TABLET ORAL at 20:54

## 2018-10-19 RX ADMIN — RISPERIDONE 3 MILLIGRAM(S): 4 TABLET ORAL at 09:25

## 2018-10-19 RX ADMIN — Medication 0.5 MILLIGRAM(S): at 20:54

## 2018-10-19 RX ADMIN — Medication 50 MILLIGRAM(S): at 21:23

## 2018-10-19 RX ADMIN — Medication 1 MILLIGRAM(S): at 09:25

## 2018-10-19 RX ADMIN — Medication 1 MILLIGRAM(S): at 13:22

## 2018-10-19 RX ADMIN — Medication 100 MILLIGRAM(S): at 09:25

## 2018-10-19 RX ADMIN — Medication 1 EACH: at 06:22

## 2018-10-20 RX ADMIN — Medication 0.5 MILLIGRAM(S): at 09:01

## 2018-10-20 RX ADMIN — Medication 100 MILLIGRAM(S): at 09:01

## 2018-10-20 RX ADMIN — Medication 0.5 MILLIGRAM(S): at 12:45

## 2018-10-20 RX ADMIN — Medication 50 MILLIGRAM(S): at 21:28

## 2018-10-20 RX ADMIN — Medication 0.5 MILLIGRAM(S): at 20:58

## 2018-10-20 RX ADMIN — OXCARBAZEPINE 300 MILLIGRAM(S): 300 TABLET, FILM COATED ORAL at 09:01

## 2018-10-20 RX ADMIN — DIVALPROEX SODIUM 1500 MILLIGRAM(S): 500 TABLET, DELAYED RELEASE ORAL at 20:58

## 2018-10-20 RX ADMIN — RISPERIDONE 3 MILLIGRAM(S): 4 TABLET ORAL at 20:58

## 2018-10-20 RX ADMIN — RISPERIDONE 3 MILLIGRAM(S): 4 TABLET ORAL at 09:01

## 2018-10-20 RX ADMIN — OXCARBAZEPINE 600 MILLIGRAM(S): 300 TABLET, FILM COATED ORAL at 20:58

## 2018-10-21 RX ORDER — POLYETHYLENE GLYCOL 3350 17 G/17G
17 POWDER, FOR SOLUTION ORAL
Refills: 0 | Status: DISCONTINUED | OUTPATIENT
Start: 2018-10-21 | End: 2018-10-29

## 2018-10-21 RX ADMIN — Medication 0.5 MILLIGRAM(S): at 14:17

## 2018-10-21 RX ADMIN — RISPERIDONE 3 MILLIGRAM(S): 4 TABLET ORAL at 08:39

## 2018-10-21 RX ADMIN — DIVALPROEX SODIUM 1500 MILLIGRAM(S): 500 TABLET, DELAYED RELEASE ORAL at 22:12

## 2018-10-21 RX ADMIN — Medication 0.5 MILLIGRAM(S): at 08:39

## 2018-10-21 RX ADMIN — OXCARBAZEPINE 300 MILLIGRAM(S): 300 TABLET, FILM COATED ORAL at 08:39

## 2018-10-21 RX ADMIN — OXCARBAZEPINE 600 MILLIGRAM(S): 300 TABLET, FILM COATED ORAL at 22:13

## 2018-10-21 RX ADMIN — RISPERIDONE 3 MILLIGRAM(S): 4 TABLET ORAL at 22:13

## 2018-10-21 RX ADMIN — POLYETHYLENE GLYCOL 3350 17 GRAM(S): 17 POWDER, FOR SOLUTION ORAL at 22:12

## 2018-10-21 RX ADMIN — Medication 100 MILLIGRAM(S): at 08:39

## 2018-10-21 RX ADMIN — Medication 0.5 MILLIGRAM(S): at 22:12

## 2018-10-22 PROCEDURE — 90853 GROUP PSYCHOTHERAPY: CPT

## 2018-10-22 PROCEDURE — 99232 SBSQ HOSP IP/OBS MODERATE 35: CPT | Mod: GC,25

## 2018-10-22 RX ORDER — DIPHENHYDRAMINE HCL 50 MG
50 CAPSULE ORAL ONCE
Refills: 0 | Status: COMPLETED | OUTPATIENT
Start: 2018-10-22 | End: 2018-10-22

## 2018-10-22 RX ADMIN — OXCARBAZEPINE 300 MILLIGRAM(S): 300 TABLET, FILM COATED ORAL at 08:04

## 2018-10-22 RX ADMIN — POLYETHYLENE GLYCOL 3350 17 GRAM(S): 17 POWDER, FOR SOLUTION ORAL at 08:04

## 2018-10-22 RX ADMIN — Medication 50 MILLIGRAM(S): at 21:05

## 2018-10-22 RX ADMIN — OXCARBAZEPINE 600 MILLIGRAM(S): 300 TABLET, FILM COATED ORAL at 20:43

## 2018-10-22 RX ADMIN — Medication 50 MILLIGRAM(S): at 00:20

## 2018-10-22 RX ADMIN — Medication 0.5 MILLIGRAM(S): at 20:43

## 2018-10-22 RX ADMIN — RISPERIDONE 3 MILLIGRAM(S): 4 TABLET ORAL at 20:43

## 2018-10-22 RX ADMIN — RISPERIDONE 3 MILLIGRAM(S): 4 TABLET ORAL at 08:04

## 2018-10-22 RX ADMIN — DIVALPROEX SODIUM 1500 MILLIGRAM(S): 500 TABLET, DELAYED RELEASE ORAL at 20:43

## 2018-10-22 RX ADMIN — POLYETHYLENE GLYCOL 3350 17 GRAM(S): 17 POWDER, FOR SOLUTION ORAL at 20:43

## 2018-10-22 RX ADMIN — Medication 0.5 MILLIGRAM(S): at 14:29

## 2018-10-22 RX ADMIN — Medication 100 MILLIGRAM(S): at 08:04

## 2018-10-22 RX ADMIN — Medication 0.5 MILLIGRAM(S): at 08:04

## 2018-10-23 PROCEDURE — 90853 GROUP PSYCHOTHERAPY: CPT

## 2018-10-23 PROCEDURE — 99232 SBSQ HOSP IP/OBS MODERATE 35: CPT | Mod: GC,25

## 2018-10-23 RX ORDER — QUETIAPINE FUMARATE 200 MG/1
100 TABLET, FILM COATED ORAL AT BEDTIME
Refills: 0 | Status: DISCONTINUED | OUTPATIENT
Start: 2018-10-23 | End: 2018-10-26

## 2018-10-23 RX ORDER — CLONAZEPAM 1 MG
0.5 TABLET ORAL
Refills: 0 | Status: DISCONTINUED | OUTPATIENT
Start: 2018-10-23 | End: 2018-10-24

## 2018-10-23 RX ORDER — CLONAZEPAM 1 MG
2 TABLET ORAL AT BEDTIME
Refills: 0 | Status: DISCONTINUED | OUTPATIENT
Start: 2018-10-23 | End: 2018-10-24

## 2018-10-23 RX ADMIN — Medication 0.5 MILLIGRAM(S): at 12:46

## 2018-10-23 RX ADMIN — Medication 0.5 MILLIGRAM(S): at 09:02

## 2018-10-23 RX ADMIN — Medication 50 MILLIGRAM(S): at 15:56

## 2018-10-23 RX ADMIN — POLYETHYLENE GLYCOL 3350 17 GRAM(S): 17 POWDER, FOR SOLUTION ORAL at 09:02

## 2018-10-23 RX ADMIN — Medication 100 MILLIGRAM(S): at 09:02

## 2018-10-23 RX ADMIN — OXCARBAZEPINE 600 MILLIGRAM(S): 300 TABLET, FILM COATED ORAL at 20:51

## 2018-10-23 RX ADMIN — OXCARBAZEPINE 300 MILLIGRAM(S): 300 TABLET, FILM COATED ORAL at 09:02

## 2018-10-23 RX ADMIN — RISPERIDONE 3 MILLIGRAM(S): 4 TABLET ORAL at 20:51

## 2018-10-23 RX ADMIN — DIVALPROEX SODIUM 1500 MILLIGRAM(S): 500 TABLET, DELAYED RELEASE ORAL at 20:51

## 2018-10-23 RX ADMIN — RISPERIDONE 3 MILLIGRAM(S): 4 TABLET ORAL at 09:02

## 2018-10-23 RX ADMIN — POLYETHYLENE GLYCOL 3350 17 GRAM(S): 17 POWDER, FOR SOLUTION ORAL at 20:51

## 2018-10-23 RX ADMIN — QUETIAPINE FUMARATE 100 MILLIGRAM(S): 200 TABLET, FILM COATED ORAL at 20:51

## 2018-10-23 RX ADMIN — Medication 2 MILLIGRAM(S): at 20:54

## 2018-10-24 LAB
ALBUMIN SERPL ELPH-MCNC: 4.1 G/DL — SIGNIFICANT CHANGE UP (ref 3.3–5)
ALP SERPL-CCNC: 70 U/L — SIGNIFICANT CHANGE UP (ref 40–120)
ALT FLD-CCNC: 38 U/L — SIGNIFICANT CHANGE UP (ref 4–41)
AST SERPL-CCNC: 21 U/L — SIGNIFICANT CHANGE UP (ref 4–40)
BILIRUB SERPL-MCNC: 0.2 MG/DL — SIGNIFICANT CHANGE UP (ref 0.2–1.2)
BUN SERPL-MCNC: 20 MG/DL — SIGNIFICANT CHANGE UP (ref 7–23)
CALCIUM SERPL-MCNC: 9.6 MG/DL — SIGNIFICANT CHANGE UP (ref 8.4–10.5)
CHLORIDE SERPL-SCNC: 102 MMOL/L — SIGNIFICANT CHANGE UP (ref 98–107)
CO2 SERPL-SCNC: 25 MMOL/L — SIGNIFICANT CHANGE UP (ref 22–31)
CREAT SERPL-MCNC: 0.93 MG/DL — SIGNIFICANT CHANGE UP (ref 0.5–1.3)
GLUCOSE SERPL-MCNC: 88 MG/DL — SIGNIFICANT CHANGE UP (ref 70–99)
HCT VFR BLD CALC: 39.7 % — SIGNIFICANT CHANGE UP (ref 39–50)
HGB BLD-MCNC: 13 G/DL — SIGNIFICANT CHANGE UP (ref 13–17)
MCHC RBC-ENTMCNC: 29.5 PG — SIGNIFICANT CHANGE UP (ref 27–34)
MCHC RBC-ENTMCNC: 32.7 % — SIGNIFICANT CHANGE UP (ref 32–36)
MCV RBC AUTO: 90 FL — SIGNIFICANT CHANGE UP (ref 80–100)
NRBC # FLD: 0 — SIGNIFICANT CHANGE UP
PLATELET # BLD AUTO: 150 K/UL — SIGNIFICANT CHANGE UP (ref 150–400)
PMV BLD: 10.1 FL — SIGNIFICANT CHANGE UP (ref 7–13)
POTASSIUM SERPL-MCNC: 4.2 MMOL/L — SIGNIFICANT CHANGE UP (ref 3.5–5.3)
POTASSIUM SERPL-SCNC: 4.2 MMOL/L — SIGNIFICANT CHANGE UP (ref 3.5–5.3)
PROT SERPL-MCNC: 6.3 G/DL — SIGNIFICANT CHANGE UP (ref 6–8.3)
RBC # BLD: 4.41 M/UL — SIGNIFICANT CHANGE UP (ref 4.2–5.8)
RBC # FLD: 14 % — SIGNIFICANT CHANGE UP (ref 10.3–14.5)
SODIUM SERPL-SCNC: 141 MMOL/L — SIGNIFICANT CHANGE UP (ref 135–145)
VALPROATE SERPL-MCNC: 62.2 UG/ML — SIGNIFICANT CHANGE UP (ref 50–100)
WBC # BLD: 5.28 K/UL — SIGNIFICANT CHANGE UP (ref 3.8–10.5)
WBC # FLD AUTO: 5.28 K/UL — SIGNIFICANT CHANGE UP (ref 3.8–10.5)

## 2018-10-24 PROCEDURE — 90853 GROUP PSYCHOTHERAPY: CPT

## 2018-10-24 PROCEDURE — 99232 SBSQ HOSP IP/OBS MODERATE 35: CPT | Mod: GC,25

## 2018-10-24 RX ORDER — DIVALPROEX SODIUM 500 MG/1
2000 TABLET, DELAYED RELEASE ORAL AT BEDTIME
Refills: 0 | Status: DISCONTINUED | OUTPATIENT
Start: 2018-10-24 | End: 2018-10-29

## 2018-10-24 RX ORDER — CLONAZEPAM 1 MG
2 TABLET ORAL AT BEDTIME
Refills: 0 | Status: DISCONTINUED | OUTPATIENT
Start: 2018-10-24 | End: 2018-10-26

## 2018-10-24 RX ORDER — CLONAZEPAM 1 MG
0.5 TABLET ORAL
Refills: 0 | Status: DISCONTINUED | OUTPATIENT
Start: 2018-10-24 | End: 2018-10-26

## 2018-10-24 RX ADMIN — Medication 0.5 MILLIGRAM(S): at 08:51

## 2018-10-24 RX ADMIN — RISPERIDONE 3 MILLIGRAM(S): 4 TABLET ORAL at 20:46

## 2018-10-24 RX ADMIN — RISPERIDONE 3 MILLIGRAM(S): 4 TABLET ORAL at 08:51

## 2018-10-24 RX ADMIN — Medication 0.5 MILLIGRAM(S): at 12:06

## 2018-10-24 RX ADMIN — Medication 2 MILLIGRAM(S): at 20:46

## 2018-10-24 RX ADMIN — OXCARBAZEPINE 600 MILLIGRAM(S): 300 TABLET, FILM COATED ORAL at 20:46

## 2018-10-24 RX ADMIN — DIVALPROEX SODIUM 2000 MILLIGRAM(S): 500 TABLET, DELAYED RELEASE ORAL at 20:46

## 2018-10-24 RX ADMIN — OXCARBAZEPINE 300 MILLIGRAM(S): 300 TABLET, FILM COATED ORAL at 08:51

## 2018-10-24 RX ADMIN — POLYETHYLENE GLYCOL 3350 17 GRAM(S): 17 POWDER, FOR SOLUTION ORAL at 20:46

## 2018-10-24 RX ADMIN — QUETIAPINE FUMARATE 100 MILLIGRAM(S): 200 TABLET, FILM COATED ORAL at 20:46

## 2018-10-24 RX ADMIN — POLYETHYLENE GLYCOL 3350 17 GRAM(S): 17 POWDER, FOR SOLUTION ORAL at 08:51

## 2018-10-24 RX ADMIN — Medication 100 MILLIGRAM(S): at 08:51

## 2018-10-24 RX ADMIN — Medication 1 EACH: at 16:13

## 2018-10-25 PROCEDURE — 99232 SBSQ HOSP IP/OBS MODERATE 35: CPT | Mod: GC,25

## 2018-10-25 PROCEDURE — 93010 ELECTROCARDIOGRAM REPORT: CPT

## 2018-10-25 PROCEDURE — 90853 GROUP PSYCHOTHERAPY: CPT

## 2018-10-25 RX ADMIN — Medication 0.5 MILLIGRAM(S): at 08:41

## 2018-10-25 RX ADMIN — DIVALPROEX SODIUM 2000 MILLIGRAM(S): 500 TABLET, DELAYED RELEASE ORAL at 21:09

## 2018-10-25 RX ADMIN — POLYETHYLENE GLYCOL 3350 17 GRAM(S): 17 POWDER, FOR SOLUTION ORAL at 08:41

## 2018-10-25 RX ADMIN — OXCARBAZEPINE 300 MILLIGRAM(S): 300 TABLET, FILM COATED ORAL at 08:41

## 2018-10-25 RX ADMIN — RISPERIDONE 3 MILLIGRAM(S): 4 TABLET ORAL at 08:41

## 2018-10-25 RX ADMIN — Medication 1 EACH: at 01:42

## 2018-10-25 RX ADMIN — QUETIAPINE FUMARATE 100 MILLIGRAM(S): 200 TABLET, FILM COATED ORAL at 21:09

## 2018-10-25 RX ADMIN — Medication 2 MILLIGRAM(S): at 21:09

## 2018-10-25 RX ADMIN — PALIPERIDONE 156 MILLIGRAM(S): 1.5 TABLET, EXTENDED RELEASE ORAL at 09:54

## 2018-10-25 RX ADMIN — POLYETHYLENE GLYCOL 3350 17 GRAM(S): 17 POWDER, FOR SOLUTION ORAL at 21:09

## 2018-10-25 RX ADMIN — Medication 100 MILLIGRAM(S): at 08:41

## 2018-10-25 RX ADMIN — OXCARBAZEPINE 600 MILLIGRAM(S): 300 TABLET, FILM COATED ORAL at 21:09

## 2018-10-25 RX ADMIN — RISPERIDONE 3 MILLIGRAM(S): 4 TABLET ORAL at 21:10

## 2018-10-25 RX ADMIN — Medication 0.5 MILLIGRAM(S): at 12:16

## 2018-10-26 PROCEDURE — 90853 GROUP PSYCHOTHERAPY: CPT

## 2018-10-26 PROCEDURE — 99232 SBSQ HOSP IP/OBS MODERATE 35: CPT | Mod: GC,25

## 2018-10-26 RX ORDER — DIVALPROEX SODIUM 500 MG/1
4 TABLET, DELAYED RELEASE ORAL
Qty: 120 | Refills: 0
Start: 2018-10-26 | End: 2018-11-24

## 2018-10-26 RX ORDER — OXCARBAZEPINE 300 MG/1
1 TABLET, FILM COATED ORAL
Qty: 90 | Refills: 0
Start: 2018-10-26 | End: 2018-11-24

## 2018-10-26 RX ORDER — QUETIAPINE FUMARATE 200 MG/1
200 TABLET, FILM COATED ORAL AT BEDTIME
Refills: 0 | Status: DISCONTINUED | OUTPATIENT
Start: 2018-10-26 | End: 2018-10-29

## 2018-10-26 RX ORDER — QUETIAPINE FUMARATE 200 MG/1
1 TABLET, FILM COATED ORAL
Qty: 30 | Refills: 0
Start: 2018-10-26 | End: 2018-11-24

## 2018-10-26 RX ORDER — CLONAZEPAM 1 MG
1 TABLET ORAL
Qty: 90 | Refills: 0
Start: 2018-10-26 | End: 2018-11-24

## 2018-10-26 RX ORDER — CLONAZEPAM 1 MG
1 TABLET ORAL
Qty: 60 | Refills: 0
Start: 2018-10-26 | End: 2018-11-24

## 2018-10-26 RX ORDER — CLONAZEPAM 1 MG
0.5 TABLET ORAL THREE TIMES A DAY
Refills: 0 | Status: DISCONTINUED | OUTPATIENT
Start: 2018-10-26 | End: 2018-10-29

## 2018-10-26 RX ORDER — CLONAZEPAM 1 MG
1 TABLET ORAL
Qty: 30 | Refills: 0
Start: 2018-10-26 | End: 2018-11-24

## 2018-10-26 RX ADMIN — DIVALPROEX SODIUM 2000 MILLIGRAM(S): 500 TABLET, DELAYED RELEASE ORAL at 21:48

## 2018-10-26 RX ADMIN — OXCARBAZEPINE 300 MILLIGRAM(S): 300 TABLET, FILM COATED ORAL at 08:38

## 2018-10-26 RX ADMIN — Medication 0.5 MILLIGRAM(S): at 21:48

## 2018-10-26 RX ADMIN — OXCARBAZEPINE 600 MILLIGRAM(S): 300 TABLET, FILM COATED ORAL at 21:48

## 2018-10-26 RX ADMIN — RISPERIDONE 3 MILLIGRAM(S): 4 TABLET ORAL at 08:38

## 2018-10-26 RX ADMIN — POLYETHYLENE GLYCOL 3350 17 GRAM(S): 17 POWDER, FOR SOLUTION ORAL at 21:47

## 2018-10-26 RX ADMIN — Medication 0.5 MILLIGRAM(S): at 13:05

## 2018-10-26 RX ADMIN — POLYETHYLENE GLYCOL 3350 17 GRAM(S): 17 POWDER, FOR SOLUTION ORAL at 08:38

## 2018-10-26 RX ADMIN — QUETIAPINE FUMARATE 200 MILLIGRAM(S): 200 TABLET, FILM COATED ORAL at 21:48

## 2018-10-26 RX ADMIN — Medication 0.5 MILLIGRAM(S): at 08:38

## 2018-10-26 RX ADMIN — Medication 100 MILLIGRAM(S): at 08:38

## 2018-10-27 RX ADMIN — Medication 0.5 MILLIGRAM(S): at 20:51

## 2018-10-27 RX ADMIN — Medication 0.5 MILLIGRAM(S): at 12:59

## 2018-10-27 RX ADMIN — OXCARBAZEPINE 600 MILLIGRAM(S): 300 TABLET, FILM COATED ORAL at 20:51

## 2018-10-27 RX ADMIN — Medication 50 MILLIGRAM(S): at 23:55

## 2018-10-27 RX ADMIN — DIVALPROEX SODIUM 2000 MILLIGRAM(S): 500 TABLET, DELAYED RELEASE ORAL at 20:51

## 2018-10-27 RX ADMIN — Medication 0.5 MILLIGRAM(S): at 09:26

## 2018-10-27 RX ADMIN — Medication 100 MILLIGRAM(S): at 09:26

## 2018-10-27 RX ADMIN — POLYETHYLENE GLYCOL 3350 17 GRAM(S): 17 POWDER, FOR SOLUTION ORAL at 09:26

## 2018-10-27 RX ADMIN — QUETIAPINE FUMARATE 200 MILLIGRAM(S): 200 TABLET, FILM COATED ORAL at 20:51

## 2018-10-27 RX ADMIN — OXCARBAZEPINE 300 MILLIGRAM(S): 300 TABLET, FILM COATED ORAL at 09:26

## 2018-10-27 RX ADMIN — POLYETHYLENE GLYCOL 3350 17 GRAM(S): 17 POWDER, FOR SOLUTION ORAL at 20:51

## 2018-10-28 RX ADMIN — POLYETHYLENE GLYCOL 3350 17 GRAM(S): 17 POWDER, FOR SOLUTION ORAL at 22:10

## 2018-10-28 RX ADMIN — Medication 0.5 MILLIGRAM(S): at 08:30

## 2018-10-28 RX ADMIN — OXCARBAZEPINE 300 MILLIGRAM(S): 300 TABLET, FILM COATED ORAL at 08:30

## 2018-10-28 RX ADMIN — QUETIAPINE FUMARATE 200 MILLIGRAM(S): 200 TABLET, FILM COATED ORAL at 22:10

## 2018-10-28 RX ADMIN — OXCARBAZEPINE 600 MILLIGRAM(S): 300 TABLET, FILM COATED ORAL at 22:10

## 2018-10-28 RX ADMIN — Medication 0.5 MILLIGRAM(S): at 12:58

## 2018-10-28 RX ADMIN — Medication 100 MILLIGRAM(S): at 08:30

## 2018-10-28 RX ADMIN — DIVALPROEX SODIUM 2000 MILLIGRAM(S): 500 TABLET, DELAYED RELEASE ORAL at 22:10

## 2018-10-28 RX ADMIN — POLYETHYLENE GLYCOL 3350 17 GRAM(S): 17 POWDER, FOR SOLUTION ORAL at 08:34

## 2018-10-28 RX ADMIN — Medication 0.5 MILLIGRAM(S): at 22:10

## 2018-10-29 VITALS — TEMPERATURE: 98 F

## 2018-10-29 PROCEDURE — 99239 HOSP IP/OBS DSCHRG MGMT >30: CPT | Mod: GC,25

## 2018-10-29 PROCEDURE — 90853 GROUP PSYCHOTHERAPY: CPT

## 2018-10-29 RX ORDER — OXCARBAZEPINE 300 MG/1
1 TABLET, FILM COATED ORAL
Qty: 90 | Refills: 0
Start: 2018-10-29 | End: 2018-11-27

## 2018-10-29 RX ADMIN — Medication 0.5 MILLIGRAM(S): at 12:27

## 2018-10-29 RX ADMIN — POLYETHYLENE GLYCOL 3350 17 GRAM(S): 17 POWDER, FOR SOLUTION ORAL at 08:37

## 2018-10-29 RX ADMIN — Medication 100 MILLIGRAM(S): at 08:37

## 2018-10-29 RX ADMIN — OXCARBAZEPINE 300 MILLIGRAM(S): 300 TABLET, FILM COATED ORAL at 08:37

## 2018-10-29 RX ADMIN — Medication 0.5 MILLIGRAM(S): at 08:37

## 2019-04-04 NOTE — ED BEHAVIORAL HEALTH ASSESSMENT NOTE - CURRENT MEDICATION
----- Message from Andrew Askew MD sent at 4/4/2019  8:09 AM CDT -----  Negative   Risperdal 3 mg BID, Trileptal 900 mg daily

## 2020-12-02 NOTE — ED PROVIDER NOTE - NSTIMEPROVIDERCAREINITIATE_GEN_ER
Eye Problem(s):cataracts and glasses or contacts  ENT Problem(s):negative  Cardiovascular problem(s):negative  Respiratory problem(s):cough and sputum  Gastro-intestinal problem(s):negative GI  Genito-urinary problem(s):incontinence  Musculoskeletal problem(s):arthritis  Integumentary problem(s):negative  Neurological problem(s):numbness or tingling of feet  Psychiatric problem(s):negative  Endocrine problem(s):negative  Hematologic and/or Lymphatic problem(s):easy bruising     12-Oct-2018 14:57
